# Patient Record
(demographics unavailable — no encounter records)

---

## 2025-03-26 NOTE — HISTORY OF PRESENT ILLNESS
[de-identified] : 44-year-old patient comes to see me regards to his left foot and ankle.  He has recent pain.  He has been told before and has had advanced imaging that he has a coalition present.  He had an MRI done in [place].  He has a history of multiple sprains.

## 2025-03-26 NOTE — DISCUSSION/SUMMARY
[de-identified] : I had a lengthy discussion with the patient regarding his condition and treatment options available.  The patient at this time would like to proceed forward with conservative management.  We will start him on physical therapy.  I recommended he continue to use her Sofradex which he acknowledges has made a huge difference for him.  I will see him back as needed.  All questions answered.

## 2025-03-26 NOTE — DATA REVIEWED
[FreeTextEntry1] : 3 views of the patient's left ankle and foot reviewed by me personally.  I also personally reviewed the patient's images from his MRI along with the report.  X-rays not demonstrate any fracture or dislocation.  There is pes planus present.  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  There is bone marrow edema present on either side of the coalition.  Deenerative changes seen.

## 2025-03-26 NOTE — PHYSICAL EXAM
[de-identified] : He is alert oriented x 3.  Pleasant cooperative.  I examined the left lower extremity.  The patient has tenderness to palpation over the middle facets of the subtalar joint.  There is pes planus present which is relatively rigid.  The hindfoot is almost completely rigid.  He is neurovascular intact distally.